# Patient Record
Sex: FEMALE | URBAN - METROPOLITAN AREA
[De-identification: names, ages, dates, MRNs, and addresses within clinical notes are randomized per-mention and may not be internally consistent; named-entity substitution may affect disease eponyms.]

---

## 2023-11-07 ENCOUNTER — APPOINTMENT (RX ONLY)
Dept: URBAN - METROPOLITAN AREA CLINIC 168 | Facility: CLINIC | Age: 43
Setting detail: DERMATOLOGY
End: 2023-11-07

## 2023-11-07 DIAGNOSIS — L64.8 OTHER ANDROGENIC ALOPECIA: ICD-10-CM | Status: INADEQUATELY CONTROLLED

## 2023-11-07 PROCEDURE — 99204 OFFICE O/P NEW MOD 45 MIN: CPT

## 2023-11-07 PROCEDURE — ? COUNSELING

## 2023-11-07 PROCEDURE — ? PRESCRIPTION MEDICATION MANAGEMENT

## 2023-11-07 PROCEDURE — ? PRESCRIPTION

## 2023-11-07 PROCEDURE — ? ADDITIONAL NOTES

## 2023-11-07 RX ORDER — DUTASTERIDE 0.5 MG/1
CAPSULE, LIQUID FILLED ORAL
Qty: 30 | Refills: 5 | Status: ERX | COMMUNITY
Start: 2023-11-07

## 2023-11-07 RX ORDER — MINOXIDIL 2.5 MG/1
TABLET ORAL BID
Qty: 30 | Refills: 6 | Status: ERX | COMMUNITY
Start: 2023-11-07

## 2023-11-07 RX ADMIN — DUTASTERIDE: 0.5 CAPSULE, LIQUID FILLED ORAL at 00:00

## 2023-11-07 RX ADMIN — MINOXIDIL: 2.5 TABLET ORAL at 00:00

## 2023-11-07 ASSESSMENT — SEVERITY OF ALOPECIA TOOL: % SCALP HAIR LOST: 20

## 2023-11-07 ASSESSMENT — LOCATION DETAILED DESCRIPTION DERM: LOCATION DETAILED: POSTERIOR MID-PARIETAL SCALP

## 2023-11-07 ASSESSMENT — LOCATION ZONE DERM: LOCATION ZONE: SCALP

## 2023-11-07 ASSESSMENT — LOCATION SIMPLE DESCRIPTION DERM: LOCATION SIMPLE: POSTERIOR SCALP

## 2023-11-07 NOTE — PROCEDURE: ADDITIONAL NOTES
Additional Notes: ***\\n-Pt is transgender M->F, initiated Spironolactone 100 mg BID ~ 16 years ago. \\n-Began losing diffuse hair ~1.5 years ago. Got a full wellness checkup (labs etc) from PCP and Rheumatology, No flags for systemic disease. \\n-Initiated Finasteride 1 mg QD ~6 months ago with little improvement.\\n-Currently using minoxidil 5% topical
Detail Level: Detailed
Render Risk Assessment In Note?: no

## 2023-11-07 NOTE — PROCEDURE: PRESCRIPTION MEDICATION MANAGEMENT
Initiate Treatment: Minoxidil 1.25 mg BID\\nDutasteride 0.5 mg every other day. Pt has been taking Finasteride 1 mg QD with minimal results.
Detail Level: Zone
Continue Regimen: Spironolactone 100 mg BID
Render In Strict Bullet Format?: No

## 2024-05-08 ENCOUNTER — APPOINTMENT (RX ONLY)
Dept: URBAN - METROPOLITAN AREA CLINIC 168 | Facility: CLINIC | Age: 44
Setting detail: DERMATOLOGY
End: 2024-05-08

## 2024-05-08 DIAGNOSIS — L64.8 OTHER ANDROGENIC ALOPECIA: ICD-10-CM | Status: IMPROVED

## 2024-05-08 PROCEDURE — 99213 OFFICE O/P EST LOW 20 MIN: CPT

## 2024-05-08 PROCEDURE — ? PRESCRIPTION

## 2024-05-08 PROCEDURE — ? COUNSELING

## 2024-05-08 PROCEDURE — ? ADDITIONAL NOTES

## 2024-05-08 PROCEDURE — ? PRESCRIPTION MEDICATION MANAGEMENT

## 2024-05-08 RX ORDER — MINOXIDIL 2.5 MG/1
TABLET ORAL BID
Qty: 30 | Refills: 6 | Status: ERX

## 2024-05-08 RX ORDER — DUTASTERIDE 0.5 MG/1
CAPSULE, LIQUID FILLED ORAL
Qty: 30 | Refills: 5 | Status: ERX

## 2024-05-08 ASSESSMENT — LOCATION SIMPLE DESCRIPTION DERM: LOCATION SIMPLE: POSTERIOR SCALP

## 2024-05-08 ASSESSMENT — LOCATION ZONE DERM: LOCATION ZONE: SCALP

## 2024-05-08 ASSESSMENT — LOCATION DETAILED DESCRIPTION DERM: LOCATION DETAILED: POSTERIOR MID-PARIETAL SCALP

## 2024-05-08 NOTE — PROCEDURE: ADDITIONAL NOTES
Additional Notes: - Patient reports no improvement, but upon examination today, there is a significant increase in density. \\n- Photos shown to patient from last visit, and she agrees that there is a significant improvement. \\n- No reported side effects, tolerating medications well
Detail Level: Detailed
Render Risk Assessment In Note?: no

## 2024-05-08 NOTE — PROCEDURE: PRESCRIPTION MEDICATION MANAGEMENT
Modify Regimen: Increase Minoxidil to 2.5 mg QD
Plan: Consider increasing dutasteride at follow up visit.
Detail Level: Zone
Continue Regimen: Spironolactone 100 mg BID\\nDutasteride 0.5 mg every other day.
Render In Strict Bullet Format?: No

## 2024-05-15 ENCOUNTER — RX ONLY (OUTPATIENT)
Age: 44
Setting detail: RX ONLY
End: 2024-05-15

## 2024-05-15 RX ORDER — DUTASTERIDE 0.5 MG/1
CAPSULE, LIQUID FILLED ORAL
Qty: 60 | Refills: 6 | Status: ERX

## 2024-12-05 ENCOUNTER — APPOINTMENT (RX ONLY)
Dept: URBAN - METROPOLITAN AREA CLINIC 168 | Facility: CLINIC | Age: 44
Setting detail: DERMATOLOGY
End: 2024-12-05

## 2024-12-05 DIAGNOSIS — L21.8 OTHER SEBORRHEIC DERMATITIS: ICD-10-CM

## 2024-12-05 DIAGNOSIS — L64.8 OTHER ANDROGENIC ALOPECIA: ICD-10-CM

## 2024-12-05 PROCEDURE — ? COUNSELING

## 2024-12-05 PROCEDURE — 99214 OFFICE O/P EST MOD 30 MIN: CPT

## 2024-12-05 PROCEDURE — ? ADDITIONAL NOTES

## 2024-12-05 PROCEDURE — ? PRESCRIPTION MEDICATION MANAGEMENT

## 2024-12-05 PROCEDURE — ? PRESCRIPTION

## 2024-12-05 RX ORDER — DUTASTERIDE 0.5 MG/1
CAPSULE, LIQUID FILLED ORAL EVERY OTHER DAY
Qty: 30 | Refills: 5 | Status: ERX

## 2024-12-05 RX ORDER — MINOXIDIL 2.5 MG/1
5 MG TABLET ORAL QD
Qty: 60 | Refills: 6 | Status: ERX

## 2024-12-05 ASSESSMENT — LOCATION DETAILED DESCRIPTION DERM
LOCATION DETAILED: POSTERIOR MID-PARIETAL SCALP
LOCATION DETAILED: LEFT MEDIAL FRONTAL SCALP

## 2024-12-05 ASSESSMENT — LOCATION SIMPLE DESCRIPTION DERM
LOCATION SIMPLE: POSTERIOR SCALP
LOCATION SIMPLE: LEFT SCALP

## 2024-12-05 ASSESSMENT — LOCATION ZONE DERM: LOCATION ZONE: SCALP

## 2024-12-05 NOTE — PROCEDURE: PRESCRIPTION MEDICATION MANAGEMENT
Modify Regimen: Increase Minoxidil to 5 mg QD
Detail Level: Zone
Continue Regimen: Spironolactone 100 mg BID-Pt gets this from PCP\\nDutasteride 0.5 mg every other day.
Render In Strict Bullet Format?: No

## 2024-12-05 NOTE — PROCEDURE: ADDITIONAL NOTES
Additional Notes: - There is very slight improvement upon examination today, but frontal hairline is still recessed.  Crown of scalp has good improving density.\\n- Side effect of inability to reach orgasm with the Dutasteride. Discussed changing to Finasteride in the future if this becomes too bothersome.  She would prefer to remain on the most potent medication for right now. \\n-Discussed increasing Minoxidil to 5 mg QD to see if we can get some increased hair density.  \\n-Shedding is improved and very minimal (she estimates  hairs daily, which is normal).
Detail Level: Detailed
Render Risk Assessment In Note?: no